# Patient Record
(demographics unavailable — no encounter records)

---

## 2024-12-02 NOTE — HISTORY OF PRESENT ILLNESS
[FreeTextEntry1] : 50 yo pt p2 (19 - at [employer] , 17)  here for annual exam stopped ocps last yr, gained wt, d- went to college.  lmp sept.  emotionally overwrought and increasingly anxious.   but getting better.  denies hot flashes, night sweats.  meds- rosuvastatin (sees cardiologist.)  surg- cs x 2, gb  no abnl paps

## 2024-12-02 NOTE — PLAN
[FreeTextEntry1] : anxiety, perimenopausal check dexa.  potentially interested in hrt.  start e2 patches, po prog. '  spent 35 min in consultation.